# Patient Record
(demographics unavailable — no encounter records)

---

## 2025-04-06 NOTE — REASON FOR VISIT
[Initial Evaluation] : an initial evaluation [FreeTextEntry1] : fPt with chief complaint of lesions rt upper medial eyelid, left posterior hairline

## 2025-04-06 NOTE — ASSESSMENT
[FreeTextEntry1] : Patient will have 2 small surface skin procedure performed in the office in the next visit under local anesthesia potential risks of bleeding infection poor scarring regrowth of the lesion need for pathology specimens to be sent all discussed.  I spent 30 minutes with this patient encounter excluding teaching and/or procedure time.

## 2025-04-06 NOTE — HISTORY OF PRESENT ILLNESS
[FreeTextEntry1] : Patient comes in with 2 complaints.  Small raised bump lesion right medial eyelid.  Left posterior ear growth or foreign body. Of note the patient had cosmetic upper blepharoplasty and facelift surgery 3 to 4 years ago and is healed nicely and it was not successful surgery performed by me at the [facility] at that time.  No other significant medical surgical history healthy on no meds

## 2025-04-17 NOTE — REASON FOR VISIT
[FreeTextEntry1] : REDDY SELF [Post Op: _________] : a [unfilled] post op visit [TextEntry] : Patient is doing well 1 week status post excision small lesion right upper medial eyelid.  Single stitch removed today without difficulty areas healing nicely soft with results will follow-up as needed healing extremely well from her facelift operation done by me in [facility] several years ago.